# Patient Record
Sex: FEMALE | Race: WHITE
[De-identification: names, ages, dates, MRNs, and addresses within clinical notes are randomized per-mention and may not be internally consistent; named-entity substitution may affect disease eponyms.]

---

## 2020-07-03 ENCOUNTER — HOSPITAL ENCOUNTER (INPATIENT)
Dept: HOSPITAL 26 - MED | Age: 30
LOS: 4 days | DRG: 720 | End: 2020-07-07
Attending: FAMILY MEDICINE | Admitting: FAMILY MEDICINE
Payer: COMMERCIAL

## 2020-07-03 VITALS — HEIGHT: 65 IN | WEIGHT: 293 LBS | BODY MASS INDEX: 48.82 KG/M2

## 2020-07-03 VITALS — SYSTOLIC BLOOD PRESSURE: 119 MMHG | DIASTOLIC BLOOD PRESSURE: 67 MMHG

## 2020-07-03 DIAGNOSIS — E86.9: ICD-10-CM

## 2020-07-03 DIAGNOSIS — E44.0: ICD-10-CM

## 2020-07-03 DIAGNOSIS — A41.89: Primary | ICD-10-CM

## 2020-07-03 DIAGNOSIS — J96.01: ICD-10-CM

## 2020-07-03 DIAGNOSIS — E66.01: ICD-10-CM

## 2020-07-03 DIAGNOSIS — J12.89: ICD-10-CM

## 2020-07-03 DIAGNOSIS — U07.1: ICD-10-CM

## 2020-07-03 LAB
ALBUMIN FLD-MCNC: 2.9 G/DL (ref 3.4–5)
ANION GAP SERPL CALCULATED.3IONS-SCNC: 12.2 MMOL/L (ref 8–16)
APPEARANCE UR: (no result)
AST SERPL-CCNC: 53 U/L (ref 15–37)
BASOPHILS # BLD AUTO: 0.1 K/UL (ref 0–0.22)
BASOPHILS NFR BLD AUTO: 0.9 % (ref 0–2)
BILIRUB SERPL-MCNC: 0.6 MG/DL (ref 0–1)
BILIRUB UR QL STRIP: (no result)
BUN SERPL-MCNC: 10 MG/DL (ref 7–18)
C-REACTIVE PROTEIN QUANT: 18.8 MG/DL (ref 0–0.9)
CHLORIDE SERPL-SCNC: 95 MMOL/L (ref 98–107)
CO2 SERPL-SCNC: 30.5 MMOL/L (ref 21–32)
COLOR UR: (no result)
CREAT SERPL-MCNC: 0.9 MG/DL (ref 0.6–1.3)
DEPRECATED D DIMER PPP-ACNC: 731 NG/ML (ref 0–400)
EOSINOPHIL # BLD AUTO: 0 K/UL (ref 0–0.4)
EOSINOPHIL NFR BLD AUTO: 0.1 % (ref 0–4)
ERYTHROCYTE [DISTWIDTH] IN BLOOD BY AUTOMATED COUNT: 13.3 % (ref 11.6–13.7)
FIBRINOGEN PPP-MCNC: 500 MG/DL (ref 200–400)
GFR SERPL CREATININE-BSD FRML MDRD: 95 ML/MIN (ref 90–?)
GLUCOSE SERPL-MCNC: 310 MG/DL (ref 74–106)
GLUCOSE UR STRIP-MCNC: (no result) MG/DL
HCT VFR BLD AUTO: 40.2 % (ref 36–48)
HGB BLD-MCNC: 13 G/DL (ref 12–16)
HGB UR QL STRIP: NEGATIVE
LDH SERPL-CCNC: 324 U/L (ref 81–234)
LEUKOCYTE ESTERASE UR QL STRIP: NEGATIVE
LYMPHOCYTES # BLD AUTO: 2.1 K/UL (ref 2.5–16.5)
LYMPHOCYTES NFR BLD AUTO: 23.7 % (ref 20.5–51.1)
MCH RBC QN AUTO: 29 PG (ref 27–31)
MCHC RBC AUTO-ENTMCNC: 32 G/DL (ref 33–37)
MCV RBC AUTO: 88.2 FL (ref 80–94)
MONOCYTES # BLD AUTO: 0.6 K/UL (ref 0.8–1)
MONOCYTES NFR BLD AUTO: 6.2 % (ref 1.7–9.3)
NEUTROPHILS # BLD AUTO: 6.3 K/UL (ref 1.8–7.7)
NEUTROPHILS NFR BLD AUTO: 69.1 % (ref 42.2–75.2)
NITRITE UR QL STRIP: NEGATIVE
PH UR STRIP: 5.5 [PH] (ref 5–9)
PLATELET # BLD AUTO: 270 K/UL (ref 140–450)
POTASSIUM SERPL-SCNC: 3.7 MMOL/L (ref 3.5–5.1)
PROTHROMBIN TIME: 10.1 SECS (ref 10.8–13.4)
RBC # BLD AUTO: 4.56 MIL/UL (ref 4.2–5.4)
SODIUM SERPL-SCNC: 134 MMOL/L (ref 136–145)
WBC # BLD AUTO: 9.1 K/UL (ref 4.8–10.8)

## 2020-07-03 RX ADMIN — SODIUM CHLORIDE SCH MLS/HR: 9 INJECTION, SOLUTION INTRAVENOUS at 15:28

## 2020-07-03 NOTE — NUR
OBTAINED VERBAL CONSENT FROM PT TO GIVE HER SISTER ORALIA FAITH MEDICAL 
UPDATES. SPOKE WITH ORALIA FAITH @ [phone].

## 2020-07-03 NOTE — NUR
PT C/O PRODUCTIVE COUGH WITH WHILE PHLEGM, SOB, FEVER X 2 DAYS, TOOK TYLENOL 
AND MOTRIN 2 HRS AGO. DENIES CP, N/V/D, SICK CONTACT. HR EVEN BUT TACHYCARDIC; 
PATIENT STATES PAIN OF 7/10 AT THIS TIME; VSS; PATIENT POSITIONED FOR COMFORT; 
HOB ELEVATED; BEDRAILS UP X2; BED DOWN. ER MD MADE AWARE OF PT STATUS. PT IS IN 
ISO ROOM AND ON MONITOR.

## 2020-07-04 VITALS — SYSTOLIC BLOOD PRESSURE: 119 MMHG | DIASTOLIC BLOOD PRESSURE: 66 MMHG

## 2020-07-04 VITALS — DIASTOLIC BLOOD PRESSURE: 72 MMHG | SYSTOLIC BLOOD PRESSURE: 134 MMHG

## 2020-07-04 VITALS — SYSTOLIC BLOOD PRESSURE: 122 MMHG | DIASTOLIC BLOOD PRESSURE: 53 MMHG

## 2020-07-04 VITALS — DIASTOLIC BLOOD PRESSURE: 50 MMHG | SYSTOLIC BLOOD PRESSURE: 128 MMHG

## 2020-07-04 VITALS — SYSTOLIC BLOOD PRESSURE: 122 MMHG | DIASTOLIC BLOOD PRESSURE: 59 MMHG

## 2020-07-04 VITALS — DIASTOLIC BLOOD PRESSURE: 62 MMHG | SYSTOLIC BLOOD PRESSURE: 142 MMHG

## 2020-07-04 LAB
ALBUMIN FLD-MCNC: 2.7 G/DL (ref 3.4–5)
ANION GAP SERPL CALCULATED.3IONS-SCNC: 10.5 MMOL/L (ref 8–16)
AST SERPL-CCNC: 53 U/L (ref 15–37)
BASOPHILS # BLD AUTO: 0 K/UL (ref 0–0.22)
BASOPHILS NFR BLD AUTO: 0.4 % (ref 0–2)
BILIRUB SERPL-MCNC: 0.5 MG/DL (ref 0–1)
BUN SERPL-MCNC: 6 MG/DL (ref 7–18)
CHLORIDE SERPL-SCNC: 99 MMOL/L (ref 98–107)
CO2 SERPL-SCNC: 30.3 MMOL/L (ref 21–32)
CREAT SERPL-MCNC: 0.8 MG/DL (ref 0.6–1.3)
EOSINOPHIL # BLD AUTO: 0 K/UL (ref 0–0.4)
EOSINOPHIL NFR BLD AUTO: 0 % (ref 0–4)
ERYTHROCYTE [DISTWIDTH] IN BLOOD BY AUTOMATED COUNT: 13.7 % (ref 11.6–13.7)
GFR SERPL CREATININE-BSD FRML MDRD: 108 ML/MIN (ref 90–?)
GLUCOSE SERPL-MCNC: 301 MG/DL (ref 74–106)
HCT VFR BLD AUTO: 39.2 % (ref 36–48)
HGB BLD-MCNC: 12.8 G/DL (ref 12–16)
LYMPHOCYTES # BLD AUTO: 2.5 K/UL (ref 2.5–16.5)
LYMPHOCYTES NFR BLD AUTO: 25.8 % (ref 20.5–51.1)
MAGNESIUM SERPL-MCNC: 2.1 MG/DL (ref 1.8–2.4)
MCH RBC QN AUTO: 29 PG (ref 27–31)
MCHC RBC AUTO-ENTMCNC: 33 G/DL (ref 33–37)
MCV RBC AUTO: 89 FL (ref 80–94)
MONOCYTES # BLD AUTO: 0.5 K/UL (ref 0.8–1)
MONOCYTES NFR BLD AUTO: 5.2 % (ref 1.7–9.3)
NEUTROPHILS # BLD AUTO: 6.6 K/UL (ref 1.8–7.7)
NEUTROPHILS NFR BLD AUTO: 68.6 % (ref 42.2–75.2)
PLATELET # BLD AUTO: 279 K/UL (ref 140–450)
POTASSIUM SERPL-SCNC: 3.8 MMOL/L (ref 3.5–5.1)
RBC # BLD AUTO: 4.4 MIL/UL (ref 4.2–5.4)
SODIUM SERPL-SCNC: 136 MMOL/L (ref 136–145)
WBC # BLD AUTO: 9.6 K/UL (ref 4.8–10.8)

## 2020-07-04 RX ADMIN — BENZONATATE SCH MG: 100 CAPSULE, LIQUID FILLED ORAL at 09:02

## 2020-07-04 RX ADMIN — CHLORHEXIDINE GLUCONATE SCH EA: 2 SOLUTION TOPICAL at 18:02

## 2020-07-04 RX ADMIN — ENOXAPARIN SODIUM SCH MG: 30 INJECTION SUBCUTANEOUS at 22:45

## 2020-07-04 RX ADMIN — SODIUM CHLORIDE SCH MLS/HR: 9 INJECTION, SOLUTION INTRAVENOUS at 03:26

## 2020-07-04 RX ADMIN — BENZONATATE SCH MG: 100 CAPSULE, LIQUID FILLED ORAL at 17:02

## 2020-07-04 RX ADMIN — BENZONATATE SCH MG: 100 CAPSULE, LIQUID FILLED ORAL at 12:23

## 2020-07-04 RX ADMIN — ENOXAPARIN SODIUM SCH MG: 120 INJECTION SUBCUTANEOUS at 22:44

## 2020-07-04 NOTE — NUR
RECEIVED PATIENT FROM NIGHT NURSE. PATIENT IS AWAKE AND ALERT. SITTING UP IN BED 
COMFORTABLY. RESP EVEN AND UNLABORED ON 6L NC. DENIES OF ANY SOB OR PAIN AT THIS TIME. PLAN 
OF CARE DISCUSSED WITH PATIENT. PATIENT VERBALIZED  UNDERSTANDING. CALL LIGHT WITHIN REACH. 
WILL CONTINUE WITH CARE.

## 2020-07-04 NOTE — NUR
PT COMPLAINS OF 5/10 HEADACHE PAIN. PRN PO NORCO GIVEN; MEDICATION EDUCATION PROVIDED. WILL 
REASSESS PAIN. PT IS SITTING UP IN BED WATCHING TV. NO ACUTE DISTRESS NOTED. WILL CONTINUE 
TO MONITOR.

## 2020-07-04 NOTE — NUR
PT SITTING AT THE EDGE OF THE BED, WITH C/O NAUSEA. PT ASSISTED WITH BED ROPER. 02 STATING AT 
84-87% WITH 6 LITERS N/C. T 98.6 P 86 R 22 B/P 122/61. CALLED RT TO REQUEST OXYMIZER DUE TO 
LOW 02 ON THE N/C. PT HAD NO C/O OF SOB.

## 2020-07-04 NOTE — NUR
PATIENT IS ENCOURAGED TO PROVIDE A SPUTUM SPECIMEN. CUP BY BEDSIDE. INSTRUCTED PATIENT TO 
CALL NURSE ONCE COLLECTED. PATIENT VERBALIZED UNDERSTANDING. ZITHROMAX IVPB STARTED. NO 
NOTED DISTRESS. CALL LIGHT WITHIN REACH. WILL CONTINUE TO MONITOR.

## 2020-07-04 NOTE — NUR
RECEIVED REPORT FROM REGISTRY NURSE. PT IS AXOX4; ENGLISH SPEAKING. PT IS COVID-19 POSITIVE. 
RESPIRATIONS ARE EVEN AND UNLABORED, BREATHING TO 6LPM NC; SPO2: 94%. TELE MONITOR ATTACHED, 
SINUS TACHYCARDIA ON THE MONITOR. RFA 20G IV IS PATENT AND INTACT, WITH IV FLUIDS RUNNING 
PER ORDERS. MRSA SPECIMEN COLLECTED. PT ORIENTED TO THE ROOM; CALL LIGHT, LIGHTS, TV, PHONE. 
PROVIDED THE PATIENT WITH WATER. SAFETY MEASURES IN PLACE, CALL LIGHT WITHIN REACH, BED IN 
LOW POSITION. NO DISTRESS NOTED. WILL CONTINUE TO MONITOR.

## 2020-07-04 NOTE — NUR
PT C/O FO 8/10 HEADACHE , HOWEVER SHE DIDN'T WANT NORCO AND SAID THAT AFTER RELIEVING HER 
H/A NORCO GAVE HER ANOTHER HEADACHE. WILL SPEAK WITH PRIMARY/OR ON CALL MD, REGARDING PT 
REQUESTS. PT STATING AT 90% WITH 10 LITERS OXYMIZER.

## 2020-07-04 NOTE — NUR
DISCHARGE PLANNING    • Discharge to home or other facility with appropriate resources Progressing        PAIN - ADULT    • Verbalizes/displays adequate comfort level or patient's stated pain goal Progressing        Patient/Family Goals    • Patient/Family MORNING ROUTINE MEDICATIONS GIVEN PER ORDER. PATIENT IS AWAKE AND ALERT. PATIENT HAS A MILD 
DRY COUGH, DENIES ANY SOB ON 6L NC. PATIENT TOLERATED MEDICATIONS WELL. DENIES PAIN AT THIS 
TIME. RIGHT FA 20 PATENT IVF NS 80 ML/HR. CALL LIGHT WITHIN REACH. WILL CONTINUE TO MONITOR.

## 2020-07-04 NOTE — NUR
PATIENT IS SITTING ON CHAIR BY BEDSIDE. RESP EVEN AND UNLABORED. CONTINUES ON 6L NC. GIVEN 
TYLENOL PRN PER REQUEST FOR HEADACHE. WILL REASSESS FOR EFFECTIVENESS. ENCOURAGED TO USE 
CALL LIGHT AS NEEDED FOR ASSIST. PATIENT VERBALIZED UNDERSTANDING.

## 2020-07-04 NOTE — NUR
PATIENT HAS BEEN SCREENED AND CATEGORIZED AS MODERATE NUTRITION RISK. PATIENT WILL BE SEEN 
WITHIN 3-5 DAYS OF ADMISSION.



07/06/20-07/08/20



FERMIN BUSH MS, RDN

## 2020-07-04 NOTE — NUR
PT WAS NOTED MOVING AROUND IN BED 02 WAS AT 80-82% CALLED RT TO RE-EVALUATE PT AND POSSIBLE 
PLACE ON OXYMIZER.

## 2020-07-04 NOTE — NUR
RECEIVED REPORT AT BEDSIDE FROM SERGIO SANDOVAL DAYSHIFT NURSE AT BEDSIDE FOR CONTINUITY OF CARE, PT 
IN STABLE CONDITION.

## 2020-07-04 NOTE — NUR
PATIENT IS SITTING ON CHAIR BY BEDSIDE. NO NOTED DISTRESS. PATIENT CONTINUES ON 6L NC. O2SAT 
92%. DENIES OF PAIN AT THIS TIME. CALL LIGHT WITHIN REACH. WILL CONTINUE TO MONITOR.

## 2020-07-04 NOTE — NUR
PT 120MCG LOVENOX SHOT WAS UNABLE TO BE GIVEN DUE TO PLUNGER WAS PULLED BY MISTAKE WHEN CAP 
WAS PULLED OFF, MEDICATION SPILT OUT. 2 LOVENOX SHOTS OF 60MCG TO MAKE 120. PT GIVEN A TOTAL 
OF 3 SHOTS TO REACH THE ORDERED 150MCG. PT ALSO GIVEN REQUESTED ZOFRAN IVP FOR NAUSEA.

## 2020-07-04 NOTE — NUR
Patient will be admitted to care of DR GARNER. Admited to TELE.  Will go to 
room 118-A. Belongings list completed.  Report to PATRICA SANDOVAL.

## 2020-07-04 NOTE — NUR
CALLED TO BEDSIDE PT DESAT TO 75% 6LNC PT WAS PLACED ON 10L OXY PT SPO2 87% AND STEADILY 
SLOWLY CLIMBING

## 2020-07-04 NOTE — NUR
CALLED TO BEDSIDE PT SPO2 LOW 80s PULSE OX AND OXY MASK READJUSTED AND SLOWLY COMING BACK UP 
SPO2 87% HR 99

## 2020-07-05 VITALS — SYSTOLIC BLOOD PRESSURE: 119 MMHG | DIASTOLIC BLOOD PRESSURE: 75 MMHG

## 2020-07-05 VITALS — SYSTOLIC BLOOD PRESSURE: 140 MMHG | DIASTOLIC BLOOD PRESSURE: 80 MMHG

## 2020-07-05 VITALS — DIASTOLIC BLOOD PRESSURE: 89 MMHG | SYSTOLIC BLOOD PRESSURE: 136 MMHG

## 2020-07-05 VITALS — DIASTOLIC BLOOD PRESSURE: 61 MMHG | SYSTOLIC BLOOD PRESSURE: 122 MMHG

## 2020-07-05 VITALS — DIASTOLIC BLOOD PRESSURE: 72 MMHG | SYSTOLIC BLOOD PRESSURE: 119 MMHG

## 2020-07-05 VITALS — SYSTOLIC BLOOD PRESSURE: 138 MMHG | DIASTOLIC BLOOD PRESSURE: 75 MMHG

## 2020-07-05 LAB
ANION GAP SERPL CALCULATED.3IONS-SCNC: 7.2 MMOL/L (ref 8–16)
BASOPHILS # BLD AUTO: 0 K/UL (ref 0–0.22)
BASOPHILS NFR BLD AUTO: 0.1 % (ref 0–2)
BUN SERPL-MCNC: 9 MG/DL (ref 7–18)
CHLORIDE SERPL-SCNC: 100 MMOL/L (ref 98–107)
CO2 SERPL-SCNC: 34 MMOL/L (ref 21–32)
CREAT SERPL-MCNC: 0.8 MG/DL (ref 0.6–1.3)
EOSINOPHIL # BLD AUTO: 0 K/UL (ref 0–0.4)
EOSINOPHIL NFR BLD AUTO: 0 % (ref 0–4)
ERYTHROCYTE [DISTWIDTH] IN BLOOD BY AUTOMATED COUNT: 13.7 % (ref 11.6–13.7)
GFR SERPL CREATININE-BSD FRML MDRD: 108 ML/MIN (ref 90–?)
GLUCOSE SERPL-MCNC: 301 MG/DL (ref 74–106)
HCT VFR BLD AUTO: 36.7 % (ref 36–48)
HGB BLD-MCNC: 11.6 G/DL (ref 12–16)
LYMPHOCYTES # BLD AUTO: 2.8 K/UL (ref 2.5–16.5)
LYMPHOCYTES NFR BLD AUTO: 23.2 % (ref 20.5–51.1)
MAGNESIUM SERPL-MCNC: 2.2 MG/DL (ref 1.8–2.4)
MCH RBC QN AUTO: 28 PG (ref 27–31)
MCHC RBC AUTO-ENTMCNC: 32 G/DL (ref 33–37)
MCV RBC AUTO: 89.5 FL (ref 80–94)
MONOCYTES # BLD AUTO: 0.4 K/UL (ref 0.8–1)
MONOCYTES NFR BLD AUTO: 3.4 % (ref 1.7–9.3)
NEUTROPHILS # BLD AUTO: 9 K/UL (ref 1.8–7.7)
NEUTROPHILS NFR BLD AUTO: 73.3 % (ref 42.2–75.2)
PHOSPHATE SERPL-MCNC: 2.9 MG/DL (ref 2.5–4.9)
PLATELET # BLD AUTO: 318 K/UL (ref 140–450)
POTASSIUM SERPL-SCNC: 4.2 MMOL/L (ref 3.5–5.1)
RBC # BLD AUTO: 4.1 MIL/UL (ref 4.2–5.4)
SODIUM SERPL-SCNC: 137 MMOL/L (ref 136–145)
WBC # BLD AUTO: 12.3 K/UL (ref 4.8–10.8)

## 2020-07-05 RX ADMIN — GUAIFENESIN AND CODEINE PHOSPHATE PRN ML: 100; 10 SOLUTION ORAL at 15:21

## 2020-07-05 RX ADMIN — OXYCODONE AND ACETAMINOPHEN PRN TAB: 5; 325 TABLET ORAL at 00:01

## 2020-07-05 RX ADMIN — DEXAMETHASONE SODIUM PHOSPHATE SCH MG: 4 INJECTION, SOLUTION INTRAMUSCULAR; INTRAVENOUS at 10:04

## 2020-07-05 RX ADMIN — Medication SCH DEV: at 11:38

## 2020-07-05 RX ADMIN — BENZONATATE SCH MG: 100 CAPSULE, LIQUID FILLED ORAL at 16:55

## 2020-07-05 RX ADMIN — CHLORHEXIDINE GLUCONATE SCH EA: 2 SOLUTION TOPICAL at 16:55

## 2020-07-05 RX ADMIN — ENOXAPARIN SODIUM SCH MG: 30 INJECTION SUBCUTANEOUS at 10:12

## 2020-07-05 RX ADMIN — INSULIN LISPRO PRN UNITS: 100 INJECTION, SOLUTION INTRAVENOUS; SUBCUTANEOUS at 12:11

## 2020-07-05 RX ADMIN — Medication SCH DEV: at 21:00

## 2020-07-05 RX ADMIN — INSULIN LISPRO PRN UNITS: 100 INJECTION, SOLUTION INTRAVENOUS; SUBCUTANEOUS at 06:57

## 2020-07-05 RX ADMIN — ENOXAPARIN SODIUM SCH MG: 120 INJECTION SUBCUTANEOUS at 21:52

## 2020-07-05 RX ADMIN — INSULIN LISPRO PRN UNITS: 100 INJECTION, SOLUTION INTRAVENOUS; SUBCUTANEOUS at 16:54

## 2020-07-05 RX ADMIN — BENZONATATE SCH MG: 100 CAPSULE, LIQUID FILLED ORAL at 10:04

## 2020-07-05 RX ADMIN — Medication SCH DEV: at 07:00

## 2020-07-05 RX ADMIN — Medication SCH DEV: at 16:42

## 2020-07-05 RX ADMIN — BENZONATATE SCH MG: 100 CAPSULE, LIQUID FILLED ORAL at 12:13

## 2020-07-05 RX ADMIN — GUAIFENESIN AND CODEINE PHOSPHATE PRN ML: 100; 10 SOLUTION ORAL at 01:14

## 2020-07-05 RX ADMIN — ENOXAPARIN SODIUM SCH MG: 30 INJECTION SUBCUTANEOUS at 21:52

## 2020-07-05 RX ADMIN — ENOXAPARIN SODIUM SCH MG: 120 INJECTION SUBCUTANEOUS at 10:11

## 2020-07-05 RX ADMIN — INSULIN LISPRO PRN UNITS: 100 INJECTION, SOLUTION INTRAVENOUS; SUBCUTANEOUS at 21:48

## 2020-07-05 NOTE — NUR
RECEIVED BEDSIDE REPORT FROM NIGHT SHIFT NURSE, BLAZE, FOR CONTINUITY OF CARE. AAOX4, ABLE 
TO MAKE NEEDS KNOWN. PATIENT IS ON 15LPM VIA NONREBREATHER. DENIES OF ANY SOB OR PAIN AT 
THIS TIME. IV SITE ON THE R HAND 24G RUNNING TKO. PLAN OF CARE DISCUSSED WITH PATIENT AND 
VERBALIZED UNDERSTANDING. DROPLET ISOLATION IN PLACE FOR + COVID-19. CALL LIGHT WITHIN 
REACH. WILL CONTINUE WITH CARE.

## 2020-07-05 NOTE — NUR
V/S TAKEN AND IS WNL. PATIENT IS ON THE NON-REBREATHER MASK AT 15LPM O2, ASLEEP AND IN BED. 
NO SIGNS OF DISTRESS NOTED. WILL CONTINUE TO MONITOR.

## 2020-07-05 NOTE — NUR
was called to room due to pt desat to 75% pt was on 100% nrb at the time, emilia dominguez at 
bedside, talked to dr. rodriguez and doctor ordered to place 6lnc with the nrb and o2 
sat came up to 87%

## 2020-07-05 NOTE — NUR
DR. ESTVEES ON CALL WITH PATIENT REGARDING PLASMA TRANSFUSION AND UPDATES ABOUT CONDITION. 
PATIENT TEACHINGS ABOUT BLOOD/PLASMA TRANSFUSION GIVEN BY DR. ESTEVES AND PATIENT VERBALIZES 
UNDERSTANDING. WILL CONTINUE TO MONITOR.

## 2020-07-05 NOTE — NUR
CALLED RT MITZI ABOUT PATIENT'S DESATURATION TO 75%. RT WILL ASSESS PATIENT. CHARGE NURSE, 
CRISTO, IS AWARE AND STATES RAPID RESPONSE NOT TO BE CALLED UNTIL RT EVALUATES PATIENT. WILL 
CONTINUE TO MONITOR.

## 2020-07-05 NOTE — NUR
PATIENT HAS SIGNED CONVALESCENT PLASMA/ BLOOD TRANSFUSION CONSENT. PATIENT VERBALIZES 
UNDERSTANDING AND NO FURTHER QUESTIONS WERE ASKED. WILL CONTINUE TO MONITOR.

## 2020-07-05 NOTE — NUR
PT IN BED ASLEEP NO S/S OF PAIN OR DISTRESS NOTED , PT WAS FOUND WITH OXYMIZER OFF. OXYMIZER 
PLACED BASK ON NOSE 02 WAS AT 79, BUT STARTED TO CLIMB UPWARDS OF 88% WILL CONTINUE TO 
MONITOR PT FOR 02 AND DISCOMFORT. ALL DROPLET AND UNIVERSAL FALLS PROTOCOL IN PLACE.

## 2020-07-05 NOTE — NUR
RRT CALLED FOR DESATURATION. PT WAS SWITCHED FROM NRB MASK TO BiPAP. BiPAP SETTINGS IPAP 16, 
EPAP 8, BACK RR 12, FiO2 100%. MACHINE PLUGGED IN RED OUTLET. ALARMS SET AND AUDIBLE. PT 
TOLERATING WELL. WE CONTINUE TO MONITOR PT.

## 2020-07-05 NOTE — NUR
FOUND PATIENT DESATURATED AT 65%, NON-REBREATHER MASK AND NASAL CANNULA WAS REMOVED BY 
PATIENT. MASK AND NASAL CANNULA WERE REINSERTED AND SAO2 INCREASE TO 90%. PATIENT ABLE TO BE 
AWAKEN BUT IS CONFUSED. EXPLAINED THE NECESSITY OF NON-REBREATHER MASK AND NASAL CANNULA 
REPEATEDLY AND PATIENT VERBALIZES UNDERSTANDING. WILL REPORT TO CHARGE NURSE, WILL CONTINUE 
TO MONITOR.

## 2020-07-05 NOTE — NUR
PT LINENS CHANGED , SHE HAD POOR APPETITE AT THIS TIME, SHE WAS MADE COMFORTABLE AND SOFT 
RESTRAINTS PLACE BACK ON WRISTS. IV SITE ON RFA 20 GUAGE INTACT AND RUNNING TO [facility]. FAMILY 
UPDATED.

## 2020-07-05 NOTE — NUR
CALLED RT CARTAGENA ABOUT PATIENT'S DESATURATION OF 85%, NON-REBREATHER MASK AND NASAL CANNULA 
STILL IN PLACE. WILL CONTINUE TO MONITOR.

## 2020-07-05 NOTE — NUR
PATIENT'S NON-REBREATHER MASKED IS FOUND AWAY FROM THE PATIENT, REPEATEDLY GAVE TEACHINGS 
AND BENEFITS OF MAINTAINING MASK, PATIENT VERBALIZES UNDERSTANDING. WILL CONTACT MD ABOUT 
REPEATED INCIDENT. WILL CONTINUE TO MONITOR.

## 2020-07-05 NOTE — NUR
PATIENT'S NON-REBREATHER MASK IS ON THE FLOOR. PATIENT AWAKENS WITH SHAKING AND CLAIMS TO 
HAVE NO SOB OR PAIN. V/S TAKEN WITH SAO2 AT 75%. WILL REPORT TO RT, CHARGE NURSE, AND MD. 
WILL CONTINUE TO MONITOR.

## 2020-07-05 NOTE — NUR
PATIENT PLACED ON RESTRAINTS, PATIENT IS AWARE AND VERBALIZES UNDERSTANDING. NO SIGNS OF 
DISTRESS NOTED. SAO2 INCREASED TO 93%. WILL CONTINUE TO MONITOR.

## 2020-07-05 NOTE — NUR
PAGED DR. URIARTE ABOUT PATIENT'S NEED FOR RESTRAINTS DUE TO REPEATED REMOVAL OF 
NON-REBREATHER MASK AND NASAL CANNULA. WILL CONTINUE TO MONITOR.

## 2020-07-05 NOTE — NUR
10 UNITS OF INSULIN GIVEN FOR BLOOD SUGAR . NO SIGNS OF DISTRESS NOTED, PATIENT CLAIMS 
TO STILL BE REALLY SLEEPY AND SAO2 REMAINS TO BE AT 88%, 15LPM O2 VIA NON-REBREATHER MASK 
AND 6LPM O2 VIA NC GIVEN SIMULTANEOUSLY. ENCOURAGED TO DEEP BREATH AND PRONE, PATIENT 
VERBALIZES UNDERSTANDING. WILL CONTINUE TO MONITOR.

## 2020-07-05 NOTE — NUR
DR. SETEVES ON THE PHONE WITH RTMITZI, NEW ORDERS TO PLACE 6LPM O2 VIA NC WITH 15LPM O2 VIA 
NON-REBREATHER MASK TO KEEP SAO2 ABOVE 87%. WILL FOLLOW THROUGH. WILL CONTINUE TO MONITOR.

## 2020-07-05 NOTE — NUR
PT IN BED SITTING UP WITH RESTRAINTS RELEASED AND  MASK OVER FACE. NASAL CANNULA RUNNING 6 
LITERS REPOSITIONED ON FACE. PT IS AOX3-4 WITH NO C/O OF PAIN SHE CONTINUES TO HAVE A DRY 
AND NONPRODUCTIVE COUGH. PT WAS ASSISTED TO THE COMMODE. PT ALSO RECEIVED ASSISTANCE WITH 
EATING AND DRINKING AT BEDSIDE.  V/S AS FOLLOWS: T 98.4 P 103 R 28 AND SHALLOW PT ON 15 
LITERS NON REBREATHER AND 6 LITERS NASAL CANNULA SHE IS STATING BETWEEN 84-86%. OTHER V/S AS 
FOLLOWS: T 98.4 P 103 B/P 137/88. ALL DROPLET PRECAUTIONS IN PLACE.

## 2020-07-05 NOTE — NUR
MORNING MEDICATIONS GIVEN. RT BY THE BEDSIDE, ENCOURAGING PATIENT TO DEEP BREATH AS SAO2 AT 
84% AND SLOWLY INCREASING ON 15LPM O2 VIA NON-REBREATHER. PATIENT COMPLAINS OF NO PAIN, NO 
SHORTNESS OF BREATH, JUST CLAIMS TO BE REALLY TIRED. WILL CONTINUE TO MONITOR.

## 2020-07-05 NOTE — NUR
PT FINGERSTICK , GIVEN 6 UNITS OF HUMALOG COVERAGE, SPOKE WITH RT DU TO PT 02 STAT 
DROPPING , PT PLACED ON 15LITERS 02 VIA NON REBREATHER MASK. 02 UP TO 90%.

## 2020-07-05 NOTE — NUR
PT HEARD YELLING DOWN THE HALLWAY FOR HELP. PT FOUND IN BED WITH MASK OFF OF MOUTH AND 
NOSE.. PT ALSO PULLED OUT HER IV SITE. RAPID RESPONSE WAS CALLED AND PT GIVEN NEW IV SITE ON 
LEFT HAND 22 GUAGE. MD RODRIGUEZ SUGGEST THAT PT BE PLACED ON A BIPAP MACHINE. PT PLACED ON 
BIPAP MACHINE 02 WENT UP TO THE LOW 90;S. PT GIVEN ALL SCHEDULED MEDS OF LOVENOX SB SHOT 
150MCG (2 SHOTS) AS WELL AS A FINGERSTICK WHICH . PT GIVEN SQ OF HUMALOG COVERAGE AS 
PER S/S. V/S AS FOLLOWS: T 97.7 P 82 R 38 P 82 02 90% B/P 127/81. PT PLACED BACK ON 
RESTRAINTS DUE TO CONFUSION.

## 2020-07-05 NOTE — NUR
SPOKE WITH ON CALL MD ESTEVES REGARDING PT REQUEST HE ORDERED PERCOCET 5/325 1 TAB FOR SEVERE 
PAIN . NORCO FOR SEVERE PAIN WAS D/C'D. ALSO SPOKE WITH MD REGARDING BLOOD GLUCOSE LEVEL OF 
THE LAST BLOOD DRAW. (301) (PT DENIES ANY HX OF DM2) . HE ORDERED HBA1C AS WELL AS F/S AND 
S/S PRN. PT INFORMED OF NEW ORDERS, SHE WAS GIVEN 1 TAB PERCOCET FOR C/O OF SEVERE HEADACHE. 
COMMODE PLACED AT BEDSIDE FOR TOILETING NEEDS. V/S AS FOLLOWS: T 97.3 P 103 R 20 B/P 134/72 
02 93% WITH 10 LITERS OXYMIZER.

## 2020-07-05 NOTE — NUR
PT IN BED V/S AS FOLLOWS: T 98.0 P 93 R 20 B/P 140/80 02 93 WITH 15 LITERS OXYMIZER. PT 
ASSISTED TO COMMODE AND BACK. ALL DROPLET PRECAUTIONS IN PLACE.

## 2020-07-06 VITALS — SYSTOLIC BLOOD PRESSURE: 114 MMHG | DIASTOLIC BLOOD PRESSURE: 75 MMHG

## 2020-07-06 VITALS — SYSTOLIC BLOOD PRESSURE: 103 MMHG | DIASTOLIC BLOOD PRESSURE: 60 MMHG

## 2020-07-06 VITALS — SYSTOLIC BLOOD PRESSURE: 110 MMHG | DIASTOLIC BLOOD PRESSURE: 52 MMHG

## 2020-07-06 VITALS — DIASTOLIC BLOOD PRESSURE: 81 MMHG | SYSTOLIC BLOOD PRESSURE: 127 MMHG

## 2020-07-06 VITALS — SYSTOLIC BLOOD PRESSURE: 127 MMHG | DIASTOLIC BLOOD PRESSURE: 80 MMHG

## 2020-07-06 VITALS — DIASTOLIC BLOOD PRESSURE: 89 MMHG | SYSTOLIC BLOOD PRESSURE: 137 MMHG

## 2020-07-06 VITALS — SYSTOLIC BLOOD PRESSURE: 120 MMHG | DIASTOLIC BLOOD PRESSURE: 58 MMHG

## 2020-07-06 VITALS — SYSTOLIC BLOOD PRESSURE: 127 MMHG | DIASTOLIC BLOOD PRESSURE: 81 MMHG

## 2020-07-06 VITALS — DIASTOLIC BLOOD PRESSURE: 75 MMHG | SYSTOLIC BLOOD PRESSURE: 114 MMHG

## 2020-07-06 VITALS — SYSTOLIC BLOOD PRESSURE: 87 MMHG | DIASTOLIC BLOOD PRESSURE: 57 MMHG

## 2020-07-06 LAB
ALBUMIN FLD-MCNC: 2.3 G/DL (ref 3.4–5)
ANION GAP SERPL CALCULATED.3IONS-SCNC: 8.8 MMOL/L (ref 8–16)
AST SERPL-CCNC: 52 U/L (ref 15–37)
BASOPHILS # BLD AUTO: 0 K/UL (ref 0–0.22)
BASOPHILS NFR BLD AUTO: 0.2 % (ref 0–2)
BILIRUB DIRECT SERPL-MCNC: 0.3 MG/DL (ref 0–0.3)
BILIRUB SERPL-MCNC: 0.5 MG/DL (ref 0–1)
BUN SERPL-MCNC: 17 MG/DL (ref 7–18)
CHLORIDE SERPL-SCNC: 100 MMOL/L (ref 98–107)
CO2 SERPL-SCNC: 32.7 MMOL/L (ref 21–32)
CREAT SERPL-MCNC: 1 MG/DL (ref 0.6–1.3)
EOSINOPHIL # BLD AUTO: 0 K/UL (ref 0–0.4)
EOSINOPHIL NFR BLD AUTO: 0 % (ref 0–4)
ERYTHROCYTE [DISTWIDTH] IN BLOOD BY AUTOMATED COUNT: 13.8 % (ref 11.6–13.7)
GFR SERPL CREATININE-BSD FRML MDRD: 84 ML/MIN (ref 90–?)
GLUCOSE SERPL-MCNC: 311 MG/DL (ref 74–106)
HCT VFR BLD AUTO: 37.2 % (ref 36–48)
HGB BLD-MCNC: 11.5 G/DL (ref 12–16)
LDH SERPL-CCNC: 354 IU/L (ref 0–214)
LYMPHOCYTES # BLD AUTO: 2.6 K/UL (ref 2.5–16.5)
LYMPHOCYTES NFR BLD AUTO: 18.8 % (ref 20.5–51.1)
MAGNESIUM SERPL-MCNC: 2.5 MG/DL (ref 1.8–2.4)
MCH RBC QN AUTO: 28 PG (ref 27–31)
MCHC RBC AUTO-ENTMCNC: 31 G/DL (ref 33–37)
MCV RBC AUTO: 90.6 FL (ref 80–94)
MONOCYTES # BLD AUTO: 0.7 K/UL (ref 0.8–1)
MONOCYTES NFR BLD AUTO: 5.2 % (ref 1.7–9.3)
NEUTROPHILS # BLD AUTO: 10.6 K/UL (ref 1.8–7.7)
NEUTROPHILS NFR BLD AUTO: 75.8 % (ref 42.2–75.2)
PHOSPHATE SERPL-MCNC: 2.3 MG/DL (ref 2.5–4.9)
PLATELET # BLD AUTO: 378 K/UL (ref 140–450)
POTASSIUM SERPL-SCNC: 4.5 MMOL/L (ref 3.5–5.1)
RBC # BLD AUTO: 4.11 MIL/UL (ref 4.2–5.4)
SODIUM SERPL-SCNC: 137 MMOL/L (ref 136–145)
WBC # BLD AUTO: 13.9 K/UL (ref 4.8–10.8)

## 2020-07-06 PROCEDURE — 5A09357 ASSISTANCE WITH RESPIRATORY VENTILATION, LESS THAN 24 CONSECUTIVE HOURS, CONTINUOUS POSITIVE AIRWAY PRESSURE: ICD-10-PCS | Performed by: FAMILY MEDICINE

## 2020-07-06 RX ADMIN — INSULIN LISPRO PRN UNITS: 100 INJECTION, SOLUTION INTRAVENOUS; SUBCUTANEOUS at 13:08

## 2020-07-06 RX ADMIN — PROPOFOL PRN MLS/HR: 10 INJECTION, EMULSION INTRAVENOUS at 20:37

## 2020-07-06 RX ADMIN — Medication SCH DEV: at 11:30

## 2020-07-06 RX ADMIN — DEXAMETHASONE SODIUM PHOSPHATE SCH MG: 4 INJECTION, SOLUTION INTRAMUSCULAR; INTRAVENOUS at 12:02

## 2020-07-06 RX ADMIN — OXYCODONE AND ACETAMINOPHEN PRN TAB: 5; 325 TABLET ORAL at 06:52

## 2020-07-06 RX ADMIN — BENZONATATE SCH MG: 100 CAPSULE, LIQUID FILLED ORAL at 12:56

## 2020-07-06 RX ADMIN — PROPOFOL PRN MLS/HR: 10 INJECTION, EMULSION INTRAVENOUS at 19:25

## 2020-07-06 RX ADMIN — Medication SCH DEV: at 07:01

## 2020-07-06 RX ADMIN — ENOXAPARIN SODIUM SCH MG: 120 INJECTION SUBCUTANEOUS at 09:00

## 2020-07-06 RX ADMIN — INSULIN LISPRO PRN UNITS: 100 INJECTION, SOLUTION INTRAVENOUS; SUBCUTANEOUS at 07:03

## 2020-07-06 RX ADMIN — BENZONATATE SCH MG: 100 CAPSULE, LIQUID FILLED ORAL at 10:31

## 2020-07-06 RX ADMIN — ENOXAPARIN SODIUM SCH MG: 30 INJECTION SUBCUTANEOUS at 09:00

## 2020-07-06 NOTE — NUR
PAGED DR. HAYES TO ASK IF HE WANTS TO TRY PATIENT ON HIGH FLOW PER RESPIRATORY REQUEST. PER 
DR. HAYES, OK TO TRY HIGH FLOW BUT HE STILL WANTS PATIENT IN ICU. NOTIFIED RESPIRATORY 
THERAPIST QUYNH. WILL CONTINUE TO MONITOR.

## 2020-07-06 NOTE — NUR
PAGED DR. JORGENSEN WAS PAGED. CALLED BACK, MADE HIM AWARE ABOUT ORDER FOR CONVALESCENT PLASMA 
TO BE ORDERED BY HIM.

## 2020-07-06 NOTE — NUR
DR. HAYES AT BEDSIDE REVIEWING PLAN OF CARE. PER DR. HAYES, HE WANTS PATIENT IN ICU FOR 
INTUBATION. NOTIFIED CHARGE RN AND HOUSE SUPERVISOR. WILL CONTINUE TO MONITOR.

## 2020-07-06 NOTE — NUR
PHONE CALL TO 'S OFFICE.SPOKE WITH SHILO.QUESTIONS ANSWERED.PER SHILO,  
WILL CALL NURSE BACK.AWAITING CALL FROM

## 2020-07-06 NOTE — NUR
rec' d pt on daniella v60 bipap settings 16/8 rr12 fio2 100% alarms on audible and bvm at hob 
and bipap is plugged into red outlet, b\s are diminished bilaterally, pt wearing med face 
mask. pt keeps taking bipap off emilia yeung at bedside.

## 2020-07-06 NOTE — NUR
PT CONTINUES TO DESAT IN MID 75% TO 80%, ON BIPAP, PER REPORT, AWAITING ON EQUIPMENT FROM 
[place] TO PLACE PT ON HIGH FLOW O2 PER DR HAYES.

## 2020-07-06 NOTE — NUR
PATIENT LEFT NOSTRILE BLEEDING, CONTINUES TO BE ON BIPAP WITH O2 SAT AT 80-90'S. WILL 
CONTINUE TO MONITOR.

## 2020-07-06 NOTE — NUR
PT HR DECREASED TO 47, UNABLE TO DETECT PULSE, CODE BLUE CALLED

-------------------------------------------------------------------------------

Addendum: 07/06/20 at 2058 by Kristal Rivera RN

-------------------------------------------------------------------------------

SEE CODE BLUE SHEET

## 2020-07-06 NOTE — NUR
PT TRANSFERRED FROM TELE, PT ON NRB MASK FOR TRANSFER, PT ABLE TO TRANSFER FROM ONE BED TO 
ANOTHER, O2SAT DECREASED TO 75-80% PLACED PT ON BIPAP RT AT BEDSIDE, PT OX3, ALL SAFETY 
MEASURES IN PLACE, CALL BELL WITHIN REACH.

## 2020-07-06 NOTE — NUR
PT REPEATEDLY TAKES OFF BIPAP MASK DESPITE EDUCATION AND RISKS EXPLAINED, DR FIELDS PAGED AND 
CALLED BACK, NOTIFIED THAT PT IS STILL IN 70-75% ON BIPAP WITH 100%FIO2, RECOMMEND 
INTUBATION, WILL CALL ER DOCTOR.

## 2020-07-06 NOTE — NUR
PAGED DR. DILL, CALLED BACK. REPORTED TO DR. DILL THAT RRT WAS CALLED ON PT FOR 
DESAT. SWITCHED PT FROM NRB MASK TO BiPAP AND SPO2 IMPROVED TO 90%.

## 2020-07-06 NOTE — NUR
NOTE:



Patient's Orientation 

Person

Situation

Place

Time

Information Provided By 

BRENDA GUTIERREZ - FATHER

Comments 

SW WAS UNABLE TO MEET PATIETN AT BEDSIDE DUE TO MEDICAL CONDITION.

, Realtionship and Phone Number 

BRENDA LEIJA

[phone]

Healthcare Power of  

No

Does Patient Have a POLST 

No

Identifying Problems 

No Social Work Triggers

Is A Social Work Consult Needed 

No

Mandate Report Filed 

No

Explanation Of Identifying Problems 

PATIENT IS A 29-YEAR-OLD FEMALE ADMITTED FOR ACUTE HYPOXIC RESPIRATORY 

FAILURE. PATIENT HAS NO PERTINENT PMHX.

Admitted From 

Home

Pre-Admission Level Of Functioning Status 

Independent/Ambulatory

Prior Resources/Services Used In Last 12 Months 

No Prior Resources Used

Prior DME 

No Prior DME Used

Living Situation 

Apartment

Lives With Family

Patient Had Caregiver 

No

Home Support 

No Caregiver Issues

Financial Issues 

No Known Financial Issue

Referral To The Financial Counselor Needed 

No

Pt/Rep Participated In Discharge Plan 

Yes

Patient/Family Agress With Discharge Plan 

Yes

Discharge Plan Comments 

TENTATIVE DISCHARGE PLAN IS FOR PATIENT TO RETURN HOME.

DC Plan Status 

Initiated

## 2020-07-06 NOTE — NUR
FAMILY CALLED AT NUMBER IN FACE SHEET, SPOKE WITH RAS FAITH, PT'S FATHER ALSO 
WITH RAS, NOTIFIED OF INTUBATION AND CRITICAL CONDITION.

## 2020-07-06 NOTE — NUR
PT KEPT REMOVED/RE-ADJUSTED MASK A FEW TIMES AND DESAT TO HIGH 7Os-LOW 80s MASK WAS REPLACED 
PROPERLY SPO2 RETURNED > 86% GRADUALLY

## 2020-07-06 NOTE — NUR
PATIENT TRANSFERRED TO ICU BED 7 AND GAVE REPORT TO RN AT ICU. PATIENT IN STABLE CONDITION. 
WILL CONTINUE TO MONITOR.

## 2020-07-06 NOTE — NUR
PHONE CALL TO [facility]; SPOKE WITH GINETTE QUICK; QUESTIONS ANSWERED.BODY RELEASED. CASE # 
[id number]

## 2020-07-06 NOTE — NUR
RSI 

ROCURONIUM 2MG @1811

SUCC 150 1812



ETT 7.5, PLACED BY DR PATTON, GOOD CHEST RISE AND FALL CO2 DETECTOR YELLOW.



STAT CHEST XRAY ORDERED

## 2020-07-06 NOTE — NUR
RECEIVED CALL FROM , JEFFERSON ESQUIVEL. CORONOR TO RECEIVE REPORT FROM MORTUARY DUE TO 
COVID-19. BODY IS NOW RELEASED.

-------------------------------------------------------------------------------

Addendum: 07/06/20 at 2249 by Ahmet Hewitt RN

-------------------------------------------------------------------------------

NO CASE NUMBER GIVEN.

## 2020-07-07 NOTE — NUR
PHONE CALL TO PTS CHINO GUTIERREZ; BRENDA GAVE THE PHONE TO HIS DAUGHTER LANETTE WHO SPEAKS 
ENGLISH. INFORMED FAMILY RE; CONTRACTED MORTUARY [facility], AS PER LANETTE TO GO 
AHEAD AND CALL THE [facility] SINCE THEY HAVE NOT DECIDED ANY MORTUARY YET.

## 2020-07-07 NOTE — NUR
PHONE CALL TO [facility], SPOKE WITH OPAL; QUESTIONS ANSWERED.MADE AWARE THAT 
 IS COVID POSITIVE AND DOUBLE BAGGED.ESTIMATED  TIME INI 3 HOURS.

PHONE CALL MADE TO LANETTE [phone], MADE AWARE OF THE ABOVE.LANETTE SAID SHE WILL GIVE THEM 
A CALL LATER.

LORI BAHENA AWARE

## 2020-07-07 NOTE — NUR
CALLED TO BEDSIDE ROUGHLY 1800 FOR INTUBATION EZ CAP COLOR CHANGE CONDENSATION IN ETT CHEST 
RISE  ETT 7.5 @ 23CM  PT PLACED ON VENT ETT VERIFIED W/ XRAY A FEW MOMENTS LATER CODE WAS 
ALSO CALLED AND  CALLED CODE ROUGHLY 1934

## 2025-06-23 NOTE — NUR
[facility]

-------------------------------------------------------------------------------

Addendum: 07/07/20 at 0311 by Ahmet Hewitt RN

-------------------------------------------------------------------------------

[facility] I have reviewed the history, physical exam, assessment and management plans.  I concur with or have edited all elements of her note.